# Patient Record
Sex: FEMALE | Race: WHITE | NOT HISPANIC OR LATINO | URBAN - METROPOLITAN AREA
[De-identification: names, ages, dates, MRNs, and addresses within clinical notes are randomized per-mention and may not be internally consistent; named-entity substitution may affect disease eponyms.]

---

## 2017-02-27 ENCOUNTER — EMERGENCY (EMERGENCY)
Facility: HOSPITAL | Age: 82
LOS: 1 days | End: 2017-02-27
Admitting: INTERNAL MEDICINE
Payer: COMMERCIAL

## 2017-02-27 ENCOUNTER — APPOINTMENT (OUTPATIENT)
Dept: CARDIOLOGY | Facility: CLINIC | Age: 82
End: 2017-02-27

## 2017-02-27 DIAGNOSIS — Z95.5 PRESENCE OF CORONARY ANGIOPLASTY IMPLANT AND GRAFT: Chronic | ICD-10-CM

## 2017-02-27 DIAGNOSIS — Z98.89 OTHER SPECIFIED POSTPROCEDURAL STATES: Chronic | ICD-10-CM

## 2017-02-27 PROCEDURE — 94664 DEMO&/EVAL PT USE INHALER: CPT

## 2017-02-27 PROCEDURE — 99283 EMERGENCY DEPT VISIT LOW MDM: CPT | Mod: 25

## 2017-02-27 PROCEDURE — 85610 PROTHROMBIN TIME: CPT

## 2017-02-27 PROCEDURE — 86850 RBC ANTIBODY SCREEN: CPT

## 2017-02-27 PROCEDURE — 86901 BLOOD TYPING SEROLOGIC RH(D): CPT

## 2017-02-27 PROCEDURE — 85027 COMPLETE CBC AUTOMATED: CPT

## 2017-02-27 PROCEDURE — 99283 EMERGENCY DEPT VISIT LOW MDM: CPT

## 2017-02-27 PROCEDURE — 85730 THROMBOPLASTIN TIME PARTIAL: CPT

## 2017-02-27 PROCEDURE — 94640 AIRWAY INHALATION TREATMENT: CPT

## 2017-02-27 PROCEDURE — 80053 COMPREHEN METABOLIC PANEL: CPT

## 2017-02-27 PROCEDURE — 86900 BLOOD TYPING SEROLOGIC ABO: CPT

## 2017-02-27 RX ORDER — OXYMETAZOLINE HYDROCHLORIDE 0.5 MG/ML
2 SPRAY NASAL
Qty: 1 | Refills: 0 | OUTPATIENT
Start: 2017-02-27 | End: 2017-03-04

## 2017-05-03 ENCOUNTER — INPATIENT (INPATIENT)
Facility: HOSPITAL | Age: 82
LOS: 0 days | DRG: 191 | End: 2017-05-04
Attending: FAMILY MEDICINE | Admitting: INTERNAL MEDICINE
Payer: COMMERCIAL

## 2017-05-03 VITALS
TEMPERATURE: 98 F | RESPIRATION RATE: 20 BRPM | SYSTOLIC BLOOD PRESSURE: 70 MMHG | OXYGEN SATURATION: 100 % | WEIGHT: 104.94 LBS | DIASTOLIC BLOOD PRESSURE: 30 MMHG | HEIGHT: 65 IN | HEART RATE: 79 BPM

## 2017-05-03 DIAGNOSIS — Z98.89 OTHER SPECIFIED POSTPROCEDURAL STATES: Chronic | ICD-10-CM

## 2017-05-03 DIAGNOSIS — Z95.5 PRESENCE OF CORONARY ANGIOPLASTY IMPLANT AND GRAFT: Chronic | ICD-10-CM

## 2017-05-03 LAB
ALBUMIN SERPL ELPH-MCNC: 3.1 G/DL — LOW (ref 3.3–5)
ALP SERPL-CCNC: 93 U/L — SIGNIFICANT CHANGE UP (ref 40–120)
ALT FLD-CCNC: 29 U/L — SIGNIFICANT CHANGE UP (ref 12–78)
ANION GAP SERPL CALC-SCNC: 10 MMOL/L — SIGNIFICANT CHANGE UP (ref 5–17)
APPEARANCE UR: CLEAR — SIGNIFICANT CHANGE UP
APTT BLD: 32.2 SEC — SIGNIFICANT CHANGE UP (ref 27.5–37.4)
AST SERPL-CCNC: 25 U/L — SIGNIFICANT CHANGE UP (ref 15–37)
BACTERIA # UR AUTO: ABNORMAL
BASOPHILS # BLD AUTO: 0 K/UL — SIGNIFICANT CHANGE UP (ref 0–0.2)
BASOPHILS NFR BLD AUTO: 0.4 % — SIGNIFICANT CHANGE UP (ref 0–2)
BILIRUB SERPL-MCNC: 0.6 MG/DL — SIGNIFICANT CHANGE UP (ref 0.2–1.2)
BILIRUB UR-MCNC: ABNORMAL
BUN SERPL-MCNC: 46 MG/DL — HIGH (ref 7–23)
CALCIUM SERPL-MCNC: 8.6 MG/DL — SIGNIFICANT CHANGE UP (ref 8.5–10.1)
CHLORIDE SERPL-SCNC: 111 MMOL/L — HIGH (ref 96–108)
CO2 SERPL-SCNC: 23 MMOL/L — SIGNIFICANT CHANGE UP (ref 22–31)
COLOR SPEC: YELLOW — SIGNIFICANT CHANGE UP
CREAT SERPL-MCNC: 2.5 MG/DL — HIGH (ref 0.5–1.3)
DIFF PNL FLD: ABNORMAL
EOSINOPHIL # BLD AUTO: 0 K/UL — SIGNIFICANT CHANGE UP (ref 0–0.5)
EOSINOPHIL NFR BLD AUTO: 0 % — SIGNIFICANT CHANGE UP (ref 0–6)
EPI CELLS # UR: SIGNIFICANT CHANGE UP
GLUCOSE SERPL-MCNC: 100 MG/DL — HIGH (ref 70–99)
GLUCOSE UR QL: NEGATIVE — SIGNIFICANT CHANGE UP
HCT VFR BLD CALC: 48 % — HIGH (ref 34.5–45)
HGB BLD-MCNC: 14.7 G/DL — SIGNIFICANT CHANGE UP (ref 11.5–15.5)
INR BLD: 1.3 RATIO — HIGH (ref 0.88–1.16)
KETONES UR-MCNC: NEGATIVE — SIGNIFICANT CHANGE UP
LACTATE SERPL-SCNC: 2.5 MMOL/L — HIGH (ref 0.7–2)
LEUKOCYTE ESTERASE UR-ACNC: NEGATIVE — SIGNIFICANT CHANGE UP
LYMPHOCYTES # BLD AUTO: 0.9 K/UL — LOW (ref 1–3.3)
LYMPHOCYTES # BLD AUTO: 7.8 % — LOW (ref 13–44)
MCHC RBC-ENTMCNC: 30.7 GM/DL — LOW (ref 32–36)
MCHC RBC-ENTMCNC: 31.4 PG — SIGNIFICANT CHANGE UP (ref 27–34)
MCV RBC AUTO: 102.3 FL — HIGH (ref 80–100)
MONOCYTES # BLD AUTO: 0.7 K/UL — SIGNIFICANT CHANGE UP (ref 0–0.9)
MONOCYTES NFR BLD AUTO: 5.6 % — SIGNIFICANT CHANGE UP (ref 1–9)
NEUTROPHILS # BLD AUTO: 10.2 K/UL — HIGH (ref 1.8–7.4)
NEUTROPHILS NFR BLD AUTO: 86.1 % — HIGH (ref 43–77)
NITRITE UR-MCNC: NEGATIVE — SIGNIFICANT CHANGE UP
PH UR: 5 — SIGNIFICANT CHANGE UP (ref 5–8)
PLATELET # BLD AUTO: 253 K/UL — SIGNIFICANT CHANGE UP (ref 150–400)
POTASSIUM SERPL-MCNC: 3.9 MMOL/L — SIGNIFICANT CHANGE UP (ref 3.5–5.3)
POTASSIUM SERPL-SCNC: 3.9 MMOL/L — SIGNIFICANT CHANGE UP (ref 3.5–5.3)
PROT SERPL-MCNC: 5.4 G/DL — LOW (ref 6–8.3)
PROT UR-MCNC: 150 MG/DL
PROTHROM AB SERPL-ACNC: 14.3 SEC — HIGH (ref 9.8–12.7)
RAPID RVP RESULT: SIGNIFICANT CHANGE UP
RBC # BLD: 4.69 M/UL — SIGNIFICANT CHANGE UP (ref 3.8–5.2)
RBC # FLD: 16.2 % — HIGH (ref 10.3–14.5)
RBC CASTS # UR COMP ASSIST: SIGNIFICANT CHANGE UP /HPF (ref 0–4)
SODIUM SERPL-SCNC: 144 MMOL/L — SIGNIFICANT CHANGE UP (ref 135–145)
SP GR SPEC: 1.02 — SIGNIFICANT CHANGE UP (ref 1.01–1.02)
UROBILINOGEN FLD QL: 1
WBC # BLD: 11.8 K/UL — HIGH (ref 3.8–10.5)
WBC # FLD AUTO: 11.8 K/UL — HIGH (ref 3.8–10.5)
WBC UR QL: SIGNIFICANT CHANGE UP

## 2017-05-03 PROCEDURE — 99285 EMERGENCY DEPT VISIT HI MDM: CPT

## 2017-05-03 PROCEDURE — 71010: CPT | Mod: 26

## 2017-05-03 PROCEDURE — 93010 ELECTROCARDIOGRAM REPORT: CPT

## 2017-05-03 RX ORDER — SODIUM CHLORIDE 9 MG/ML
1000 INJECTION INTRAMUSCULAR; INTRAVENOUS; SUBCUTANEOUS ONCE
Qty: 0 | Refills: 0 | Status: COMPLETED | OUTPATIENT
Start: 2017-05-03 | End: 2017-05-03

## 2017-05-03 RX ORDER — IPRATROPIUM/ALBUTEROL SULFATE 18-103MCG
3 AEROSOL WITH ADAPTER (GRAM) INHALATION ONCE
Qty: 0 | Refills: 0 | Status: COMPLETED | OUTPATIENT
Start: 2017-05-03 | End: 2017-05-03

## 2017-05-03 RX ORDER — SODIUM CHLORIDE 9 MG/ML
1000 INJECTION INTRAMUSCULAR; INTRAVENOUS; SUBCUTANEOUS ONCE
Qty: 0 | Refills: 0 | Status: DISCONTINUED | OUTPATIENT
Start: 2017-05-03 | End: 2017-05-03

## 2017-05-03 RX ORDER — SODIUM CHLORIDE 9 MG/ML
500 INJECTION INTRAMUSCULAR; INTRAVENOUS; SUBCUTANEOUS ONCE
Qty: 0 | Refills: 0 | Status: COMPLETED | OUTPATIENT
Start: 2017-05-03 | End: 2017-05-03

## 2017-05-03 RX ADMIN — Medication 3 MILLILITER(S): at 22:00

## 2017-05-03 RX ADMIN — Medication 3 MILLILITER(S): at 22:25

## 2017-05-03 RX ADMIN — SODIUM CHLORIDE 1000 MILLILITER(S): 9 INJECTION INTRAMUSCULAR; INTRAVENOUS; SUBCUTANEOUS at 22:56

## 2017-05-03 RX ADMIN — Medication 125 MILLIGRAM(S): at 22:00

## 2017-05-03 NOTE — H&P ADULT - NEGATIVE NEUROLOGICAL SYMPTOMS
no loss of sensation/no transient paralysis/no vertigo/no generalized seizures/no tremors/no syncope/no paresthesias/no focal seizures

## 2017-05-03 NOTE — ED ADULT NURSE NOTE - ED STAT RN HANDOFF DETAILS
1 east handoff, accepting RN made aware that 500 Ml of NS IV Bolus still needs to be administered following current running  1 Liter Bolus of NS

## 2017-05-03 NOTE — ED ADULT NURSE NOTE - PMH
Angina pectoris    CAD (coronary artery disease)    Chronic obstructive pulmonary disease    Depression

## 2017-05-03 NOTE — H&P ADULT - HISTORY OF PRESENT ILLNESS
This is a 79 YO Female bib ems for shortness of breath/dyspnea on exertion. No fevers, chills, ha, cough, chest pain, abd pain, d/n/v, edema. This is a 81 YO Female transferred from SNF because of shortness of breath and dyspnea on exertion. No fevers, chills, cough, chest pain, abd pain, d/n/v, edema. Patient mildly cognition deficit.

## 2017-05-03 NOTE — ED PROVIDER NOTE - OBJECTIVE STATEMENT
pt bib ems for shortness of breath/dyspnea on exertion. no fevers, chills, ha, cough, chest pain, abd pain, d/n/v, edema.  pmd/pulm - [facility]

## 2017-05-03 NOTE — ED ADULT NURSE NOTE - OBJECTIVE STATEMENT
pt presenting to ED with shortness of breath, dyspnea with exertion. pt denies pain or any other discomfort at this time.

## 2017-05-04 VITALS
OXYGEN SATURATION: 94 % | SYSTOLIC BLOOD PRESSURE: 134 MMHG | RESPIRATION RATE: 18 BRPM | HEART RATE: 71 BPM | TEMPERATURE: 98 F | DIASTOLIC BLOOD PRESSURE: 87 MMHG

## 2017-05-04 DIAGNOSIS — A41.9 SEPSIS, UNSPECIFIED ORGANISM: ICD-10-CM

## 2017-05-04 DIAGNOSIS — I25.10 ATHEROSCLEROTIC HEART DISEASE OF NATIVE CORONARY ARTERY WITHOUT ANGINA PECTORIS: ICD-10-CM

## 2017-05-04 DIAGNOSIS — N17.9 ACUTE KIDNEY FAILURE, UNSPECIFIED: ICD-10-CM

## 2017-05-04 DIAGNOSIS — J44.1 CHRONIC OBSTRUCTIVE PULMONARY DISEASE WITH (ACUTE) EXACERBATION: ICD-10-CM

## 2017-05-04 DIAGNOSIS — I95.9 HYPOTENSION, UNSPECIFIED: ICD-10-CM

## 2017-05-04 DIAGNOSIS — J18.0 BRONCHOPNEUMONIA, UNSPECIFIED ORGANISM: ICD-10-CM

## 2017-05-04 DIAGNOSIS — I10 ESSENTIAL (PRIMARY) HYPERTENSION: ICD-10-CM

## 2017-05-04 DIAGNOSIS — R06.00 DYSPNEA, UNSPECIFIED: ICD-10-CM

## 2017-05-04 DIAGNOSIS — R06.02 SHORTNESS OF BREATH: ICD-10-CM

## 2017-05-04 LAB
LACTATE SERPL-SCNC: 2.2 MMOL/L — HIGH (ref 0.7–2)
LACTATE SERPL-SCNC: 2.6 MMOL/L — HIGH (ref 0.7–2)
PROCALCITONIN SERPL-MCNC: 1.91 NG/ML — HIGH (ref 0–0.05)
T3 SERPL-MCNC: 45 NG/DL — LOW (ref 80–200)
T4 AB SER-ACNC: 3.5 UG/DL — LOW (ref 4.6–12)
TSH SERPL-MCNC: 8.54 UIU/ML — HIGH (ref 0.36–3.74)

## 2017-05-04 PROCEDURE — 87798 DETECT AGENT NOS DNA AMP: CPT

## 2017-05-04 PROCEDURE — 99285 EMERGENCY DEPT VISIT HI MDM: CPT | Mod: 25

## 2017-05-04 PROCEDURE — 81001 URINALYSIS AUTO W/SCOPE: CPT

## 2017-05-04 PROCEDURE — 85610 PROTHROMBIN TIME: CPT

## 2017-05-04 PROCEDURE — 85027 COMPLETE CBC AUTOMATED: CPT

## 2017-05-04 PROCEDURE — 87086 URINE CULTURE/COLONY COUNT: CPT

## 2017-05-04 PROCEDURE — 94760 N-INVAS EAR/PLS OXIMETRY 1: CPT

## 2017-05-04 PROCEDURE — 84436 ASSAY OF TOTAL THYROXINE: CPT

## 2017-05-04 PROCEDURE — 96365 THER/PROPH/DIAG IV INF INIT: CPT

## 2017-05-04 PROCEDURE — 71045 X-RAY EXAM CHEST 1 VIEW: CPT

## 2017-05-04 PROCEDURE — 87633 RESP VIRUS 12-25 TARGETS: CPT

## 2017-05-04 PROCEDURE — 94640 AIRWAY INHALATION TREATMENT: CPT

## 2017-05-04 PROCEDURE — 84145 PROCALCITONIN (PCT): CPT

## 2017-05-04 PROCEDURE — 84480 ASSAY TRIIODOTHYRONINE (T3): CPT

## 2017-05-04 PROCEDURE — 93005 ELECTROCARDIOGRAM TRACING: CPT

## 2017-05-04 PROCEDURE — 87040 BLOOD CULTURE FOR BACTERIA: CPT

## 2017-05-04 PROCEDURE — 96372 THER/PROPH/DIAG INJ SC/IM: CPT | Mod: 59

## 2017-05-04 PROCEDURE — 85730 THROMBOPLASTIN TIME PARTIAL: CPT

## 2017-05-04 PROCEDURE — 96375 TX/PRO/DX INJ NEW DRUG ADDON: CPT

## 2017-05-04 PROCEDURE — 97162 PT EVAL MOD COMPLEX 30 MIN: CPT

## 2017-05-04 PROCEDURE — 87486 CHLMYD PNEUM DNA AMP PROBE: CPT

## 2017-05-04 PROCEDURE — 87581 M.PNEUMON DNA AMP PROBE: CPT

## 2017-05-04 PROCEDURE — 80053 COMPREHEN METABOLIC PANEL: CPT

## 2017-05-04 PROCEDURE — 96376 TX/PRO/DX INJ SAME DRUG ADON: CPT

## 2017-05-04 PROCEDURE — 84443 ASSAY THYROID STIM HORMONE: CPT

## 2017-05-04 PROCEDURE — 76770 US EXAM ABDO BACK WALL COMP: CPT

## 2017-05-04 PROCEDURE — 76770 US EXAM ABDO BACK WALL COMP: CPT | Mod: 26

## 2017-05-04 PROCEDURE — 83605 ASSAY OF LACTIC ACID: CPT

## 2017-05-04 RX ORDER — FAMOTIDINE 10 MG/ML
20 INJECTION INTRAVENOUS DAILY
Qty: 0 | Refills: 0 | Status: DISCONTINUED | OUTPATIENT
Start: 2017-05-04 | End: 2017-05-04

## 2017-05-04 RX ORDER — ACETAMINOPHEN 500 MG
650 TABLET ORAL EVERY 6 HOURS
Qty: 0 | Refills: 0 | Status: DISCONTINUED | OUTPATIENT
Start: 2017-05-04 | End: 2017-05-04

## 2017-05-04 RX ORDER — ASPIRIN/CALCIUM CARB/MAGNESIUM 324 MG
81 TABLET ORAL DAILY
Qty: 0 | Refills: 0 | Status: DISCONTINUED | OUTPATIENT
Start: 2017-05-04 | End: 2017-05-04

## 2017-05-04 RX ORDER — ALPRAZOLAM 0.25 MG
0.5 TABLET ORAL DAILY
Qty: 0 | Refills: 0 | Status: DISCONTINUED | OUTPATIENT
Start: 2017-05-04 | End: 2017-05-04

## 2017-05-04 RX ORDER — CLOPIDOGREL BISULFATE 75 MG/1
75 TABLET, FILM COATED ORAL DAILY
Qty: 0 | Refills: 0 | Status: DISCONTINUED | OUTPATIENT
Start: 2017-05-04 | End: 2017-05-04

## 2017-05-04 RX ORDER — SENNA PLUS 8.6 MG/1
2 TABLET ORAL AT BEDTIME
Qty: 0 | Refills: 0 | Status: DISCONTINUED | OUTPATIENT
Start: 2017-05-04 | End: 2017-05-04

## 2017-05-04 RX ORDER — SODIUM CHLORIDE 9 MG/ML
1000 INJECTION, SOLUTION INTRAVENOUS
Qty: 0 | Refills: 0 | Status: DISCONTINUED | OUTPATIENT
Start: 2017-05-04 | End: 2017-05-04

## 2017-05-04 RX ORDER — LACTOBACILLUS ACIDOPHILUS 100MM CELL
1 CAPSULE ORAL DAILY
Qty: 0 | Refills: 0 | Status: DISCONTINUED | OUTPATIENT
Start: 2017-05-04 | End: 2017-05-04

## 2017-05-04 RX ORDER — HEPARIN SODIUM 5000 [USP'U]/ML
5000 INJECTION INTRAVENOUS; SUBCUTANEOUS EVERY 12 HOURS
Qty: 0 | Refills: 0 | Status: DISCONTINUED | OUTPATIENT
Start: 2017-05-04 | End: 2017-05-04

## 2017-05-04 RX ORDER — TRAMADOL HYDROCHLORIDE 50 MG/1
25 TABLET ORAL
Qty: 0 | Refills: 0 | Status: DISCONTINUED | OUTPATIENT
Start: 2017-05-04 | End: 2017-05-04

## 2017-05-04 RX ORDER — DOCUSATE SODIUM 100 MG
100 CAPSULE ORAL THREE TIMES A DAY
Qty: 0 | Refills: 0 | Status: DISCONTINUED | OUTPATIENT
Start: 2017-05-04 | End: 2017-05-04

## 2017-05-04 RX ORDER — BUDESONIDE, MICRONIZED 100 %
0.5 POWDER (GRAM) MISCELLANEOUS
Qty: 0 | Refills: 0 | Status: DISCONTINUED | OUTPATIENT
Start: 2017-05-04 | End: 2017-05-04

## 2017-05-04 RX ORDER — ZOLPIDEM TARTRATE 10 MG/1
5 TABLET ORAL AT BEDTIME
Qty: 0 | Refills: 0 | Status: DISCONTINUED | OUTPATIENT
Start: 2017-05-04 | End: 2017-05-04

## 2017-05-04 RX ORDER — METOPROLOL TARTRATE 50 MG
50 TABLET ORAL DAILY
Qty: 0 | Refills: 0 | Status: DISCONTINUED | OUTPATIENT
Start: 2017-05-04 | End: 2017-05-04

## 2017-05-04 RX ORDER — IPRATROPIUM/ALBUTEROL SULFATE 18-103MCG
3 AEROSOL WITH ADAPTER (GRAM) INHALATION EVERY 6 HOURS
Qty: 0 | Refills: 0 | Status: DISCONTINUED | OUTPATIENT
Start: 2017-05-04 | End: 2017-05-04

## 2017-05-04 RX ORDER — TIOTROPIUM BROMIDE 18 UG/1
1 CAPSULE ORAL; RESPIRATORY (INHALATION) AT BEDTIME
Qty: 0 | Refills: 0 | Status: DISCONTINUED | OUTPATIENT
Start: 2017-05-04 | End: 2017-05-04

## 2017-05-04 RX ORDER — LIDOCAINE 4 G/100G
1 CREAM TOPICAL DAILY
Qty: 0 | Refills: 0 | Status: DISCONTINUED | OUTPATIENT
Start: 2017-05-04 | End: 2017-05-04

## 2017-05-04 RX ADMIN — Medication 3 MILLILITER(S): at 18:50

## 2017-05-04 RX ADMIN — FAMOTIDINE 20 MILLIGRAM(S): 10 INJECTION INTRAVENOUS at 11:54

## 2017-05-04 RX ADMIN — HEPARIN SODIUM 5000 UNIT(S): 5000 INJECTION INTRAVENOUS; SUBCUTANEOUS at 05:15

## 2017-05-04 RX ADMIN — Medication 40 MILLIGRAM(S): at 05:15

## 2017-05-04 RX ADMIN — Medication 0.5 MILLIGRAM(S): at 11:54

## 2017-05-04 RX ADMIN — ZOLPIDEM TARTRATE 5 MILLIGRAM(S): 10 TABLET ORAL at 02:05

## 2017-05-04 RX ADMIN — Medication 3 MILLILITER(S): at 13:41

## 2017-05-04 RX ADMIN — Medication 0.5 MILLIGRAM(S): at 07:52

## 2017-05-04 RX ADMIN — Medication 650 MILLIGRAM(S): at 17:58

## 2017-05-04 RX ADMIN — CLOPIDOGREL BISULFATE 75 MILLIGRAM(S): 75 TABLET, FILM COATED ORAL at 11:54

## 2017-05-04 RX ADMIN — Medication 20 MILLIGRAM(S): at 11:54

## 2017-05-04 RX ADMIN — Medication 3 MILLILITER(S): at 07:52

## 2017-05-04 RX ADMIN — SODIUM CHLORIDE 1000 MILLILITER(S): 9 INJECTION INTRAMUSCULAR; INTRAVENOUS; SUBCUTANEOUS at 03:12

## 2017-05-04 RX ADMIN — Medication 81 MILLIGRAM(S): at 11:55

## 2017-05-04 RX ADMIN — Medication 40 MILLIGRAM(S): at 14:23

## 2017-05-04 RX ADMIN — SODIUM CHLORIDE 75 MILLILITER(S): 9 INJECTION, SOLUTION INTRAVENOUS at 11:55

## 2017-05-04 RX ADMIN — HEPARIN SODIUM 5000 UNIT(S): 5000 INJECTION INTRAVENOUS; SUBCUTANEOUS at 17:12

## 2017-05-04 RX ADMIN — Medication 50 MILLIGRAM(S): at 05:15

## 2017-05-04 NOTE — PROGRESS NOTE ADULT - SUBJECTIVE AND OBJECTIVE BOX
Patient is a 81y old  Female who presents with a chief complaint of SOB (03 May 2017 23:54)      INTERVAL HPI/OVERNIGHT EVENTS:    MEDICATIONS  (STANDING):  heparin  Injectable 5000Unit(s) SubCutaneous every 12 hours  aspirin enteric coated 81milliGRAM(s) Oral daily  PARoxetine 20milliGRAM(s) Oral daily  clopidogrel Tablet 75milliGRAM(s) Oral daily  ALPRAZolam 0.5milliGRAM(s) Oral daily  metoprolol succinate ER 50milliGRAM(s) Oral daily  ALBUTerol/ipratropium for Nebulization 3milliLiter(s) Nebulizer every 6 hours  methylPREDNISolone sodium succinate Injectable 40milliGRAM(s) IV Push every 8 hours  famotidine    Tablet 20milliGRAM(s) Oral daily  tiotropium 18 MICROgram(s) Capsule 1Capsule(s) Inhalation at bedtime  buDESOnide   0.5 milliGRAM(s) Respule 0.5milliGRAM(s) Inhalation two times a day  sodium chloride 0.45%. 1000milliLiter(s) IV Continuous <Continuous>    MEDICATIONS  (PRN):  zolpidem 5milliGRAM(s) Oral at bedtime PRN Insomnia  zolpidem 5milliGRAM(s) Oral at bedtime PRN Insomnia  acetaminophen  Suppository 650milliGRAM(s) Rectal every 6 hours PRN For Temp greater than 38 C (100.4 F)  acetaminophen  Suppository. 650milliGRAM(s) Rectal every 6 hours PRN Moderate Pain (4 - 6)      Allergies    loracarbef (Anaphylaxis)  penicillin (Unknown)    Intolerances        REVIEW OF SYSTEMS:  CONSTITUTIONAL: No fever, weight loss, or fatigue  EYES: No eye pain, visual disturbances, or discharge  ENMT:  No difficulty hearing, tinnitus, vertigo; No sinus or throat pain  NECK: No pain or stiffness  BREASTS: No pain, masses, or nipple discharge  RESPIRATORY: No cough, wheezing, chills or hemoptysis; No shortness of breath  CARDIOVASCULAR: No chest pain, palpitations, dizziness, or leg swelling  GASTROINTESTINAL: No abdominal or epigastric pain. No nausea, vomiting, or hematemesis; No diarrhea or constipation. No melena or hematochezia.  GENITOURINARY: No dysuria, frequency, hematuria, or incontinence  NEUROLOGICAL: No headaches, memory loss, loss of strength, numbness, or tremors  SKIN: No itching, burning, rashes, or lesions   LYMPH NODES: No enlarged glands  ENDOCRINE: No heat or cold intolerance; No hair loss  MUSCULOSKELETAL: No joint pain or swelling; No muscle, back, or extremity pain  PSYCHIATRIC: No depression, anxiety, mood swings, or difficulty sleeping  HEME/LYMPH: No easy bruising, or bleeding gums  ALLERY AND IMMUNOLOGIC: No hives or eczema    Vital Signs Last 24 Hrs  T(C): 36.4, Max: 37.8 ( @ 20:41)  T(F): 97.6, Max: 100 ( @ 20:41)  HR: 65 (65 - 79)  BP: 141/96 (70/30 - 141/96)  BP(mean): --  RR: 17 (16 - 20)  SpO2: 94% (94% - 100%)    PHYSICAL EXAM:  GENERAL: NAD, well-groomed, well-developed  HEAD:  Atraumatic, Normocephalic  EYES: EOMI, PERRLA, conjunctiva and sclera clear  ENMT: No tonsillar erythema, exudates, or enlargement; Moist mucous membranes, Good dentition, No lesions  NECK: Supple, No JVD, Normal thyroid  NERVOUS SYSTEM:  Alert & Oriented X3, Good concentration; Motor Strength 5/5 B/L upper and lower extremities; DTRs 2+ intact and symmetric  CHEST/LUNG: Clear to percussion bilaterally; No rales, rhonchi, wheezing, or rubs  HEART: Regular rate and rhythm; No murmurs, rubs, or gallops  ABDOMEN: Soft, Nontender, Nondistended; Bowel sounds present  EXTREMITIES:  2+ Peripheral Pulses, No clubbing, cyanosis, or edema  LYMPH: No lymphadenopathy noted  SKIN: No rashes or lesions    LABS:                        14.7   11.8  )-----------( 253      ( 03 May 2017 21:42 )             48.0     -    144  |  111<H>  |  46<H>  ----------------------------<  100<H>  3.9   |  23  |  2.50<H>    Ca    8.6      03 May 2017 23:23    TPro  5.4<L>  /  Alb  3.1<L>  /  TBili  0.6  /  DBili  x   /  AST  25  /  ALT  29  /  AlkPhos  93  05-03    PT/INR - ( 03 May 2017 21:42 )   PT: 14.3 sec;   INR: 1.30 ratio         PTT - ( 03 May 2017 21:42 )  PTT:32.2 sec  Urinalysis Basic - ( 03 May 2017 23:06 )    Color: Yellow / Appearance: Clear / S.020 / pH: x  Gluc: x / Ketone: Negative  / Bili: Small / Urobili: 1   Blood: x / Protein: 150 mg/dL / Nitrite: Negative   Leuk Esterase: Negative / RBC: 0-2 /HPF / WBC 3-5   Sq Epi: x / Non Sq Epi: Few / Bacteria: Few      CAPILLARY BLOOD GLUCOSE    Lipid panel:           TSH     RADIOLOGY & ADDITIONAL TESTS:    Imaging Personally Reviewed:   [x] YES  [ ] NO    Consultant(s) Notes Reviewed:  [x] YES  [ ] NO    Care Discussed with Consultants/Other Providers [x] YES  [ ] NO Patient is a 81y old  Female who presents with a chief complaint of SOB (03 May 2017 23:54)      INTERVAL HPI/OVERNIGHT EVENTS:  A-O X 3, feeling better then yesterday.    MEDICATIONS  (STANDING):  heparin  Injectable 5000Unit(s) SubCutaneous every 12 hours  aspirin enteric coated 81milliGRAM(s) Oral daily  PARoxetine 20milliGRAM(s) Oral daily  clopidogrel Tablet 75milliGRAM(s) Oral daily  ALPRAZolam 0.5milliGRAM(s) Oral daily  metoprolol succinate ER 50milliGRAM(s) Oral daily  ALBUTerol/ipratropium for Nebulization 3milliLiter(s) Nebulizer every 6 hours  methylPREDNISolone sodium succinate Injectable 40milliGRAM(s) IV Push every 8 hours  famotidine    Tablet 20milliGRAM(s) Oral daily  tiotropium 18 MICROgram(s) Capsule 1Capsule(s) Inhalation at bedtime  buDESOnide   0.5 milliGRAM(s) Respule 0.5milliGRAM(s) Inhalation two times a day  sodium chloride 0.45%. 1000milliLiter(s) IV Continuous <Continuous>    MEDICATIONS  (PRN):  zolpidem 5milliGRAM(s) Oral at bedtime PRN Insomnia  zolpidem 5milliGRAM(s) Oral at bedtime PRN Insomnia  acetaminophen  Suppository 650milliGRAM(s) Rectal every 6 hours PRN For Temp greater than 38 C (100.4 F)  acetaminophen  Suppository. 650milliGRAM(s) Rectal every 6 hours PRN Moderate Pain (4 - 6)      Allergies    loracarbef (Anaphylaxis)  penicillin (Unknown)    Intolerances      Vital Signs Last 24 Hrs  T(C): 36.4, Max: 37.8 (05-03 @ 20:41)  T(F): 97.6, Max: 100 (05-03 @ 20:41)  HR: 65 (65 - 79)  BP: 141/96 (70/30 - 141/96)  BP(mean): --  RR: 17 (16 - 20)  SpO2: 94% (94% - 100%)    PHYSICAL EXAM:  GENERAL: NAD, well-groomed, well-developed  HEAD:  Atraumatic, Normocephalic  EYES: EOMI, PERRLA, conjunctiva and sclera clear  ENMT: No tonsillar erythema, exudates, or enlargement; Moist mucous membranes, Good dentition, No lesions  NECK: Supple, No JVD, Normal thyroid  NERVOUS SYSTEM:  Alert & Oriented X3, Good concentration; Motor Strength 5/5 B/L upper and lower extremities; DTRs 2+ intact and symmetric  CHEST/LUNG: Clear to percussion bilaterally; No rales, rhonchi, wheezing, or rubs  HEART: Regular rate and rhythm; No murmurs, rubs, or gallops  ABDOMEN: Soft, Nontender, Nondistended; Bowel sounds present  EXTREMITIES:  2+ Peripheral Pulses, No clubbing, cyanosis, or edema  LYMPH: No lymphadenopathy noted  SKIN: No rashes or lesions    LABS:                        14.7   11.8  )-----------( 253      ( 03 May 2017 21:42 )             48.0     05-    144  |  111<H>  |  46<H>  ----------------------------<  100<H>  3.9   |  23  |  2.50<H>    Ca    8.6      03 May 2017 23:23    TPro  5.4<L>  /  Alb  3.1<L>  /  TBili  0.6  /  DBili  x   /  AST  25  /  ALT  29  /  AlkPhos  93  -    PT/INR - ( 03 May 2017 21:42 )   PT: 14.3 sec;   INR: 1.30 ratio         PTT - ( 03 May 2017 21:42 )  PTT:32.2 sec  Urinalysis Basic - ( 03 May 2017 23:06 )    Color: Yellow / Appearance: Clear / S.020 / pH: x  Gluc: x / Ketone: Negative  / Bili: Small / Urobili: 1   Blood: x / Protein: 150 mg/dL / Nitrite: Negative   Leuk Esterase: Negative / RBC: 0-2 /HPF / WBC 3-5   Sq Epi: x / Non Sq Epi: Few / Bacteria: Few      CAPILLARY BLOOD GLUCOSE    Lipid panel:           TSH     RADIOLOGY & ADDITIONAL TESTS:    Imaging Personally Reviewed:   [x] YES  [ ] NO    Consultant(s) Notes Reviewed:  [x] YES  [ ] NO    Care Discussed with Consultants/Other Providers [x] YES  [ ] NO

## 2017-05-04 NOTE — CONSULT NOTE ADULT - SUBJECTIVE AND OBJECTIVE BOX
Patient is a 81y old  Female who presents with a chief complaint of SOB. Renal consult called for WOO.     HPI:  This is a 79 YO Female bib ems for shortness of breath/dyspnea on exertion. Renal consult called for WOO. Baseline crea 1.4.   Pt denies any use of NSAIDs. ? Was started on oxybutynin for urinary incontinence.       PAST MEDICAL HISTORY:  Angina pectoris  CAD (coronary artery disease)  Chronic obstructive pulmonary disease  Depression      PAST SURGICAL HISTORY: None      FAMILY HISTORY: None contributory      SOCIAL HISTORY: Former smoker, no alcohol     Allergies    loracarbef (Anaphylaxis)  penicillin (Unknown)    Intolerances      MEDICATIONS  (STANDING):  heparin  Injectable 5000Unit(s) SubCutaneous every 12 hours  aspirin enteric coated 81milliGRAM(s) Oral daily  PARoxetine 20milliGRAM(s) Oral daily  clopidogrel Tablet 75milliGRAM(s) Oral daily  ALPRAZolam 0.5milliGRAM(s) Oral daily  metoprolol succinate ER 50milliGRAM(s) Oral daily  ALBUTerol/ipratropium for Nebulization 3milliLiter(s) Nebulizer every 6 hours  methylPREDNISolone sodium succinate Injectable 40milliGRAM(s) IV Push every 8 hours  famotidine    Tablet 20milliGRAM(s) Oral daily  tiotropium 18 MICROgram(s) Capsule 1Capsule(s) Inhalation at bedtime  buDESOnide   0.5 milliGRAM(s) Respule 0.5milliGRAM(s) Inhalation two times a day    MEDICATIONS  (PRN):  zolpidem 5milliGRAM(s) Oral at bedtime PRN Insomnia  zolpidem 5milliGRAM(s) Oral at bedtime PRN Insomnia  acetaminophen  Suppository 650milliGRAM(s) Rectal every 6 hours PRN For Temp greater than 38 C (100.4 F)  acetaminophen  Suppository. 650milliGRAM(s) Rectal every 6 hours PRN Moderate Pain (4 - 6)      REVIEW OF SYSTEMS:  General: NAD  Respiratory: + SOB  Cardiovascular: No CP or Palpitations	  Gastrointestinal: No nausea, Vomiting. No diarrhea  Genitourinary: ? Urinary incontinence.	  Musculoskeletal: No edema, No new rash or lesions		    T(F): 97.6, Max: 100 (05-03 @ 20:41)  HR: 65 (65 - 79)  BP: 141/96 (70/30 - 141/96)  RR: 17 (16 - 20)  SpO2: 94% (94% - 100%)  Wt(kg): --    PHYSICAL EXAM:  General: NAD  Respiratory: b/l air entry  Cardiovascular: S1 S2  Gastrointestinal: soft  Extremities: no edema            144  |  111<H>  |  46<H>  ----------------------------<  100<H>  3.9   |  23  |  2.50<H>    Ca    8.6      03 May 2017 23:23    TPro  5.4<L>  /  Alb  3.1<L>  /  TBili  0.6  /  DBili  x   /  AST  25  /  ALT  29  /  AlkPhos  93                            14.7   11.8  )-----------( 253      ( 03 May 2017 21:42 )             48.0       Potassium, Serum: 3.9 mmol/L ( @ 23:23)  Blood Urea Nitrogen, Serum: 46 mg/dL ( @ 23:23)  Calcium, Total Serum: 8.6 mg/dL ( @ 23:23)  Hemoglobin: 14.7 g/dL ( @ 21:42)      Creatinine, Serum: 2.50 ( @ 23:23)      Urinalysis Basic - ( 03 May 2017 23:06 )    Color: Yellow / Appearance: Clear / S.020 / pH: x  Gluc: x / Ketone: Negative  / Bili: Small / Urobili: 1   Blood: x / Protein: 150 mg/dL / Nitrite: Negative   Leuk Esterase: Negative / RBC: 0-2 /HPF / WBC 3-5   Sq Epi: x / Non Sq Epi: Few / Bacteria: Few      LIVER FUNCTIONS - ( 03 May 2017 23:23 )  Alb: 3.1 g/dL / Pro: 5.4 g/dL / ALK PHOS: 93 U/L / ALT: 29 U/L / AST: 25 U/L / GGT: x                   I&O's Detail    I & Os for current day (as of 04 May 2017 10:24)  =============================================  IN:    Oral Fluid: 240 ml    Total IN: 240 ml  ---------------------------------------------  OUT:    Total OUT: 0 ml  ---------------------------------------------  Total NET: 240 ml    EXAM:  US KIDNEYS AND BLADDER                            PROCEDURE DATE:  2017        INTERPRETATION:  EXAM: Renal and urinary bladder sonogram.    CLINICAL INFORMATION: Acute kidney injury.    FINDINGS: Right kidney measures 8.3 cm in length. Left kidney measures   7.8 cm in length. Bilateral renal cysts are noted. No hydronephrosis is   seen. No solid malignant appearing mass lesions are seen related to   either kidney. No sonographically detectable renal calculi are noted.   Incidentally noted is a 7 mm calculus in the gallbladder lumen. Common   bile duct is incidentally noted to be dilated to 9 mm diameter. Clinical   correlation is advised. Ascites is present. Urinary bladder volume is 136   mL. Patient was not able to void. Ureteral jets were not visualized.    IMPRESSION: No hydronephrosis. Urinary bladder volume is 136 mL as   discussed above. Patient is a 81y old  Female who presents with a chief complaint of SOB. Renal consult called for WOO.     HPI:  This is a 81 YO Female bib ems for shortness of breath/dyspnea on exertion. Renal consult called for WOO. Baseline crea 1.4.   Pt denies any use of NSAIDs. ? Was started on oxybutynin for urinary incontinence.     b/l small kidneys on sonogram. ? atrophic left kidney.       PAST MEDICAL HISTORY:  Angina pectoris  CAD (coronary artery disease)  Chronic obstructive pulmonary disease  Depression      PAST SURGICAL HISTORY: None      FAMILY HISTORY: None contributory      SOCIAL HISTORY: Former smoker, no alcohol     Allergies    loracarbef (Anaphylaxis)  penicillin (Unknown)    Intolerances      MEDICATIONS  (STANDING):  heparin  Injectable 5000Unit(s) SubCutaneous every 12 hours  aspirin enteric coated 81milliGRAM(s) Oral daily  PARoxetine 20milliGRAM(s) Oral daily  clopidogrel Tablet 75milliGRAM(s) Oral daily  ALPRAZolam 0.5milliGRAM(s) Oral daily  metoprolol succinate ER 50milliGRAM(s) Oral daily  ALBUTerol/ipratropium for Nebulization 3milliLiter(s) Nebulizer every 6 hours  methylPREDNISolone sodium succinate Injectable 40milliGRAM(s) IV Push every 8 hours  famotidine    Tablet 20milliGRAM(s) Oral daily  tiotropium 18 MICROgram(s) Capsule 1Capsule(s) Inhalation at bedtime  buDESOnide   0.5 milliGRAM(s) Respule 0.5milliGRAM(s) Inhalation two times a day    MEDICATIONS  (PRN):  zolpidem 5milliGRAM(s) Oral at bedtime PRN Insomnia  zolpidem 5milliGRAM(s) Oral at bedtime PRN Insomnia  acetaminophen  Suppository 650milliGRAM(s) Rectal every 6 hours PRN For Temp greater than 38 C (100.4 F)  acetaminophen  Suppository. 650milliGRAM(s) Rectal every 6 hours PRN Moderate Pain (4 - 6)      REVIEW OF SYSTEMS:  General: NAD  Respiratory: + SOB  Cardiovascular: No CP or Palpitations	  Gastrointestinal: No nausea, Vomiting. No diarrhea  Genitourinary: ? Urinary incontinence.	  Musculoskeletal: No edema, No new rash or lesions		    T(F): 97.6, Max: 100 (05-03 @ 20:41)  HR: 65 (65 - 79)  BP: 141/96 (70/30 - 141/96)  RR: 17 (16 - 20)  SpO2: 94% (94% - 100%)  Wt(kg): --    PHYSICAL EXAM:  General: NAD  Respiratory: b/l air entry  Cardiovascular: S1 S2  Gastrointestinal: soft  Extremities: no edema            144  |  111<H>  |  46<H>  ----------------------------<  100<H>  3.9   |  23  |  2.50<H>    Ca    8.6      03 May 2017 23:23    TPro  5.4<L>  /  Alb  3.1<L>  /  TBili  0.6  /  DBili  x   /  AST  25  /  ALT  29  /  AlkPhos  93                            14.7   11.8  )-----------( 253      ( 03 May 2017 21:42 )             48.0       Potassium, Serum: 3.9 mmol/L ( @ 23:23)  Blood Urea Nitrogen, Serum: 46 mg/dL ( @ 23:23)  Calcium, Total Serum: 8.6 mg/dL ( @ 23:23)  Hemoglobin: 14.7 g/dL ( @ 21:42)      Creatinine, Serum: 2.50 ( @ 23:23)      Urinalysis Basic - ( 03 May 2017 23:06 )    Color: Yellow / Appearance: Clear / S.020 / pH: x  Gluc: x / Ketone: Negative  / Bili: Small / Urobili: 1   Blood: x / Protein: 150 mg/dL / Nitrite: Negative   Leuk Esterase: Negative / RBC: 0-2 /HPF / WBC 3-5   Sq Epi: x / Non Sq Epi: Few / Bacteria: Few      LIVER FUNCTIONS - ( 03 May 2017 23:23 )  Alb: 3.1 g/dL / Pro: 5.4 g/dL / ALK PHOS: 93 U/L / ALT: 29 U/L / AST: 25 U/L / GGT: x                   I&O's Detail    I & Os for current day (as of 04 May 2017 10:24)  =============================================  IN:    Oral Fluid: 240 ml    Total IN: 240 ml  ---------------------------------------------  OUT:    Total OUT: 0 ml  ---------------------------------------------  Total NET: 240 ml    EXAM:  US KIDNEYS AND BLADDER                            PROCEDURE DATE:  2017        INTERPRETATION:  EXAM: Renal and urinary bladder sonogram.    CLINICAL INFORMATION: Acute kidney injury.    FINDINGS: Right kidney measures 8.3 cm in length. Left kidney measures   7.8 cm in length. Bilateral renal cysts are noted. No hydronephrosis is   seen. No solid malignant appearing mass lesions are seen related to   either kidney. No sonographically detectable renal calculi are noted.   Incidentally noted is a 7 mm calculus in the gallbladder lumen. Common   bile duct is incidentally noted to be dilated to 9 mm diameter. Clinical   correlation is advised. Ascites is present. Urinary bladder volume is 136   mL. Patient was not able to void. Ureteral jets were not visualized.    IMPRESSION: No hydronephrosis. Urinary bladder volume is 136 mL as   discussed above.

## 2017-05-04 NOTE — CHART NOTE - NSCHARTNOTEFT_GEN_A_CORE
Expiration Note- PGY2 On Call    Assessed patient at bedside as patient appears to not be breathing/ is pulseless / has asystole on monitor. Pt unresponsive to verbal, physical and painful stimuli.    Pupils were not reactive.  There was no corneal reflex.  There was no carotid or femoral pulse. There were no heart or lung sounds. sounds.     Time of death: 18:58 May 5th, 2017   Attending: Dr Hooks notified  Family member Gabriella (HCP) notified.  They will not be coming to the hospital.

## 2017-05-04 NOTE — DISCHARGE NOTE FOR THE EXPIRED PATIENT - SECONDARY DIAGNOSIS.
Chronic obstructive pulmonary disease with acute exacerbation Dyspnea on exertion Coronary artery disease involving native coronary artery of native heart without angina pectoris Depression, unspecified depression type Angina pectoris WOO (acute kidney injury) Bronchopneumonia

## 2017-05-04 NOTE — GOALS OF CARE CONVERSATION - PERSONAL ADVANCE DIRECTIVE - CONVERSATION DETAILS
spoke to pt, [employer], pt knew her daughter oswald is away and daughter nikki phone number. spoke to Nikki on phone, there is a hcp, pt daughter Oswald is hcp, but out of the country till 5/9/17. Nikki is surrogate for medical decisions at present, knew pt directives regarding cpr, dnr dni. daughter agreed to dnr dni, hospital consent obtained. will email the molst form to discuss further. contact # given

## 2017-05-04 NOTE — PROVIDER CONTACT NOTE (CRITICAL VALUE NOTIFICATION) - ACTION/TREATMENT ORDERED:
Continue with IV fluid as ordered
MD said this should not have been redrawn before the bolus was hung. She said to finish this bolus, hang the next bolus, and she'll put in orders to redraw bolus in four hours.

## 2017-05-04 NOTE — CONSULT NOTE ADULT - PROBLEM SELECTOR PROBLEM 3
Coronary artery disease involving native coronary artery of native heart without angina pectoris
Hypertension

## 2017-05-04 NOTE — PROGRESS NOTE ADULT - SUBJECTIVE AND OBJECTIVE BOX
Patient is a 81y old  Female who presents with a chief complaint of SOB (03 May 2017 23:54)      INTERVAL /OVERNIGHT EVENTS: + constipation, feels dry, still SOB / FOWLER    MEDICATIONS  (STANDING):  heparin  Injectable 5000Unit(s) SubCutaneous every 12 hours  aspirin enteric coated 81milliGRAM(s) Oral daily  PARoxetine 20milliGRAM(s) Oral daily  clopidogrel Tablet 75milliGRAM(s) Oral daily  ALPRAZolam 0.5milliGRAM(s) Oral daily  metoprolol succinate ER 50milliGRAM(s) Oral daily  ALBUTerol/ipratropium for Nebulization 3milliLiter(s) Nebulizer every 6 hours  methylPREDNISolone sodium succinate Injectable 40milliGRAM(s) IV Push every 8 hours  famotidine    Tablet 20milliGRAM(s) Oral daily  tiotropium 18 MICROgram(s) Capsule 1Capsule(s) Inhalation at bedtime  buDESOnide   0.5 milliGRAM(s) Respule 0.5milliGRAM(s) Inhalation two times a day  sodium chloride 0.45%. 1000milliLiter(s) IV Continuous <Continuous>  docusate sodium 100milliGRAM(s) Oral three times a day  senna 2Tablet(s) Oral at bedtime  lactobacillus acidophilus 1Tablet(s) Oral daily  multivitamin 1Tablet(s) Oral daily    MEDICATIONS  (PRN):  zolpidem 5milliGRAM(s) Oral at bedtime PRN Insomnia  zolpidem 5milliGRAM(s) Oral at bedtime PRN Insomnia  bisacodyl 5milliGRAM(s) Oral every 12 hours PRN Constipation  acetaminophen   Tablet. 650milliGRAM(s) Oral every 6 hours PRN Moderate Pain (4 - 6)      Allergies    loracarbef (Anaphylaxis)  penicillin (Unknown)    Intolerances        REVIEW OF SYSTEMS:  CONSTITUTIONAL: No fever, weight loss, or fatigue  EYES: No eye pain, visual disturbances, or discharge  ENMT:  No difficulty hearing, tinnitus, vertigo; No sinus or throat pain  NECK: No pain or stiffness  RESPIRATORY: No cough, wheezing, chills or hemoptysis; No shortness of breath  CARDIOVASCULAR: No chest pain, palpitations, dizziness, or leg swelling  GASTROINTESTINAL: No abdominal or epigastric pain. No nausea, vomiting, or hematemesis; No diarrhea or constipation. No melena or hematochezia.  GENITOURINARY: No dysuria, frequency, hematuria, or incontinence  NEUROLOGICAL: No headaches, memory loss, loss of strength, numbness, or tremors  SKIN: No itching, burning, rashes, or lesions   LYMPH NODES: No enlarged glands  ENDOCRINE: No heat or cold intolerance; No hair loss; No polydipsia or polyuria  MUSCULOSKELETAL: No joint pain or swelling; No muscle, back, or extremity pain  PSYCHIATRIC: No depression, anxiety, mood swings, or difficulty sleeping  HEME/LYMPH: No easy bruising, or bleeding gums  ALLERGY AND IMMUNOLOGIC: No hives or eczema    Vital Signs Last 24 Hrs  T(C): 36.4, Max: 37.8 ( @ 20:41)  T(F): 97.6, Max: 100 ( @ 20:41)  HR: 71 (65 - 79)  BP: 134/87 (70/30 - 141/96)  BP(mean): --  RR: 18 (16 - 20)  SpO2: 94% (94% - 100%)    PHYSICAL EXAM:  GENERAL: NAD, well-groomed, well-developed  HEAD:  Atraumatic, Normocephalic  EYES: EOMI, PERRLA, conjunctiva and sclera clear  ENMT: No tonsillar erythema, exudates, or enlargement; Moist mucous membranes, Good dentition, No lesions  NECK: Supple, No JVD, Normal thyroid  NERVOUS SYSTEM:  Alert & Oriented X3, Good concentration; Motor Strength 5/5 B/L upper and lower extremities; DTRs 2+ intact and symmetric  CHEST/LUNG: Clear to auscultation bilaterally; No rales, rhonchi, wheezing, or rubs  HEART: Regular rate and rhythm; No murmurs, rubs, or gallops  ABDOMEN: Soft, Nontender, Nondistended; Bowel sounds present  EXTREMITIES:  2+ Peripheral Pulses, No clubbing, cyanosis, or edema  LYMPH: No lymphadenopathy noted  SKIN: No rashes or lesions    LABS:                        14.7   11.8  )-----------( 253      ( 03 May 2017 21:42 )             48.0     03 May 2017 23:23    144    |  111    |  46     ----------------------------<  100    3.9     |  23     |  2.50     Ca    8.6        03 May 2017 23:23    TPro  5.4    /  Alb  3.1    /  TBili  0.6    /  DBili  x      /  AST  25     /  ALT  29     /  AlkPhos  93     03 May 2017 23:23    PT/INR - ( 03 May 2017 21:42 )   PT: 14.3 sec;   INR: 1.30 ratio         PTT - ( 03 May 2017 21:42 )  PTT:32.2 sec  Urinalysis Basic - ( 03 May 2017 23:06 )    Color: Yellow / Appearance: Clear / S.020 / pH: x  Gluc: x / Ketone: Negative  / Bili: Small / Urobili: 1   Blood: x / Protein: 150 mg/dL / Nitrite: Negative   Leuk Esterase: Negative / RBC: 0-2 /HPF / WBC 3-5   Sq Epi: x / Non Sq Epi: Few / Bacteria: Few      CAPILLARY BLOOD GLUCOSE      RADIOLOGY & ADDITIONAL TESTS: EXAM:  CHEST 1 VIEW                            PROCEDURE DATE:  2017        INTERPRETATION:  Short of breath.    AP chest.  Heart magnified by AP film, not grossly enlarged. Atherosclerotic aorta.   Symmetrically hyperinflated lungs. No consolidation or effusion. Right   costophrenic angle excluded. Degenerative change right acromioclavicular   joint.    Impression: Hyperinflated lungs. No active infiltrates.        EXAM:  US KIDNEYS AND BLADDER                            PROCEDURE DATE:  2017        INTERPRETATION:  EXAM: Renal and urinary bladder sonogram.    CLINICAL INFORMATION: Acute kidney injury.    FINDINGS: Right kidney measures 8.3 cm in length. Left kidney measures   7.8 cm in length. Bilateral renal cysts are noted. No hydronephrosis is   seen. No solid malignant appearing mass lesions are seen related to   either kidney. No sonographically detectable renal calculi are noted.   Incidentally noted is a 7 mm calculus in the gallbladder lumen. Common   bile duct is incidentally noted to be dilated to 9 mm diameter. Clinical   correlation is advised. Ascites is present. Urinary bladder volume is 136   mL. Patient was not able to void. Ureteral jets were not visualized.    IMPRESSION: No hydronephrosis. Urinary bladder volume is 136 mL as   discussed above.Notes Reviewed:  [x ] YES  [ ] NO    Care Discussed with Consultants/Other Providers [x ] YES  [ ] NO

## 2017-05-04 NOTE — PROGRESS NOTE ADULT - PROBLEM SELECTOR PROBLEM 3
Hypertension
Coronary artery disease involving native coronary artery of native heart without angina pectoris

## 2017-05-04 NOTE — PHYSICAL THERAPY INITIAL EVALUATION ADULT - ADDITIONAL COMMENTS
Pt stated she ambulates only with assist from aide with rolling walker.  Also has W/C at home for long distances and home O2.

## 2017-05-04 NOTE — DISCHARGE NOTE FOR THE EXPIRED PATIENT - HOSPITAL COURSE
Patient was transferred from SNF because of sudden onset of dyspnea and on ER arrival patient was hypoxic.  Patient has history of COPD. Patient was feeling better in am after bronchodilators. Patient was ambulatory. Patient went to bathroom and came back normal to bed. RN noticed that she was quit and she went check her  and she found her unresponsive and there was no pulse or respiration. DNR/DNI protocol followed patient's DTR informed. ME called and case discussed and ME released the case.

## 2017-05-04 NOTE — CONSULT NOTE ADULT - SUBJECTIVE AND OBJECTIVE BOX
PULMONARY/CRITICAL CARE        Patient is a 81y old  Female who presents with a chief complaint of SOB   Pt well known to me. Hx severe O2 dependent COPD. C/o severe weakness. No fever,chills sweats.    BRIEF HOSPITAL COURSE: ***    Events last 24 hours: ***    PAST MEDICAL & SURGICAL HISTORY:  Angina pectoris  CAD (coronary artery disease)  Chronic obstructive pulmonary disease  Depression    Allergies    loracarbef (Anaphylaxis)  penicillin (Unknown)    Intolerances      FAMILY HISTORY: N/C  Soc Hx--lives at home. Former heavy smoker. No etoh      Review of Systems:  CONSTITUTIONAL: No fever, chills, or fatigue  EYES: No eye pain, visual disturbances, or discharge  ENMT:  No difficulty hearing, tinnitus, vertigo; No sinus or throat pain  NECK: No pain or stiffness  RESPIRATORY: No cough, wheezing, chills or hemoptysis; Mod. shortness of breath  CARDIOVASCULAR: No chest pain, palpitations, dizziness, or leg swelling  GASTROINTESTINAL: No abdominal or epigastric pain. No nausea, vomiting, or hematemesis; No diarrhea or constipation. No melena or hematochezia.  GENITOURINARY: No dysuria, frequency, hematuria, or incontinence  NEUROLOGICAL: No headaches, memory loss, loss of strength, numbness, or tremors  SKIN: No itching, burning, rashes, or lesions   MUSCULOSKELETAL: No joint pain or swelling; No muscle, back, or extremity pain Some swelling right lower leg.  PSYCHIATRIC: No depression, anxiety, mood swings, or difficulty sleeping      Medications:    metoprolol succinate ER 50milliGRAM(s) Oral daily    ALBUTerol/ipratropium for Nebulization 3milliLiter(s) Nebulizer every 6 hours  tiotropium 18 MICROgram(s) Capsule 1Capsule(s) Inhalation at bedtime  buDESOnide   0.5 milliGRAM(s) Respule 0.5milliGRAM(s) Inhalation two times a day    PARoxetine 20milliGRAM(s) Oral daily  ALPRAZolam 0.5milliGRAM(s) Oral daily  zolpidem 5milliGRAM(s) Oral at bedtime PRN  zolpidem 5milliGRAM(s) Oral at bedtime PRN  acetaminophen  Suppository 650milliGRAM(s) Rectal every 6 hours PRN  acetaminophen  Suppository. 650milliGRAM(s) Rectal every 6 hours PRN      heparin  Injectable 5000Unit(s) SubCutaneous every 12 hours  aspirin enteric coated 81milliGRAM(s) Oral daily  clopidogrel Tablet 75milliGRAM(s) Oral daily    famotidine    Tablet 20milliGRAM(s) Oral daily      methylPREDNISolone sodium succinate Injectable 40milliGRAM(s) IV Push every 8 hours                  ICU Vital Signs Last 24 Hrs  T(C): 36.4, Max: 37.8 ( @ 20:41)  T(F): 97.6, Max: 100 ( @ 20:41)  HR: 65 (65 - 79)  BP: 141/96 (70/30 - 141/96)  BP(mean): --  ABP: --  ABP(mean): --  RR: 17 (16 - 20)  SpO2: 94% (94% - 100%)    Vital Signs Last 24 Hrs  T(C): 36.4, Max: 37.8 ( @ 20:41)  T(F): 97.6, Max: 100 ( @ 20:41)  HR: 65 (65 - 79)  BP: 141/96 (70/30 - 141/96)  BP(mean): --  RR: 17 (16 - 20)  SpO2: 94% (94% - 100%)        I&O's Detail        LABS:                        14.7   11.8  )-----------( 253      ( 03 May 2017 21:42 )             48.0         144  |  111<H>  |  46<H>  ----------------------------<  100<H>  3.9   |  23  |  2.50<H>    Ca    8.6      03 May 2017 23:23    TPro  5.4<L>  /  Alb  3.1<L>  /  TBili  0.6  /  DBili  x   /  AST  25  /  ALT  29  /  AlkPhos  93            CAPILLARY BLOOD GLUCOSE    PT/INR - ( 03 May 2017 21:42 )   PT: 14.3 sec;   INR: 1.30 ratio         PTT - ( 03 May 2017 21:42 )  PTT:32.2 sec  Urinalysis Basic - ( 03 May 2017 23:06 )    Color: Yellow / Appearance: Clear / S.020 / pH: x  Gluc: x / Ketone: Negative  / Bili: Small / Urobili: 1   Blood: x / Protein: 150 mg/dL / Nitrite: Negative   Leuk Esterase: Negative / RBC: 0-2 /HPF / WBC 3-5   Sq Epi: x / Non Sq Epi: Few / Bacteria: Few      CULTURES:      Physical Examination:  Alert and conversant.  General: No acute distress.      HEENT: Pupils equal, reactive to light.  Symmetric.    PULM: Few bas crackles, decreased BS. no wheeze, rhonchi. No change percussion or fremitus    CVS: Regular rate and rhythm, no murmurs, rubs, or gallops    ABD: Soft, nondistended, nontender, normoactive bowel sounds, no masses    EXT: Trace leg edema, nontender    SKIN: Warm and well perfused, no rashes noted.    NEURO: Alert, oriented, interactive, nonfocal    RADIOLOGY: ***    CRITICAL CARE TIME SPENT: ***

## 2017-05-05 LAB
CULTURE RESULTS: NO GROWTH — SIGNIFICANT CHANGE UP
SPECIMEN SOURCE: SIGNIFICANT CHANGE UP

## 2017-05-09 LAB
CULTURE RESULTS: SIGNIFICANT CHANGE UP
SPECIMEN SOURCE: SIGNIFICANT CHANGE UP

## 2017-05-09 RX ORDER — ZOLPIDEM TARTRATE 10 MG/1
1 TABLET ORAL
Qty: 0 | Refills: 0 | COMMUNITY

## 2017-05-09 RX ORDER — CLOPIDOGREL BISULFATE 75 MG/1
0 TABLET, FILM COATED ORAL
Qty: 0 | Refills: 0 | COMMUNITY

## 2017-05-09 RX ORDER — METOPROLOL TARTRATE 50 MG
1 TABLET ORAL
Qty: 0 | Refills: 0 | COMMUNITY

## 2017-05-09 RX ORDER — ALPRAZOLAM 0.25 MG
1 TABLET ORAL
Qty: 0 | Refills: 0 | COMMUNITY

## 2017-05-09 RX ORDER — FLUTICASONE PROPIONATE AND SALMETEROL 50; 250 UG/1; UG/1
1 POWDER ORAL; RESPIRATORY (INHALATION)
Qty: 0 | Refills: 0 | COMMUNITY

## 2017-05-09 RX ORDER — ASPIRIN/CALCIUM CARB/MAGNESIUM 324 MG
1 TABLET ORAL
Qty: 0 | Refills: 0 | COMMUNITY

## 2017-05-10 DIAGNOSIS — J18.0 BRONCHOPNEUMONIA, UNSPECIFIED ORGANISM: ICD-10-CM

## 2017-05-10 DIAGNOSIS — N17.9 ACUTE KIDNEY FAILURE, UNSPECIFIED: ICD-10-CM

## 2017-05-10 DIAGNOSIS — Z88.8 ALLERGY STATUS TO OTHER DRUGS, MEDICAMENTS AND BIOLOGICAL SUBSTANCES STATUS: ICD-10-CM

## 2017-05-10 DIAGNOSIS — I25.10 ATHEROSCLEROTIC HEART DISEASE OF NATIVE CORONARY ARTERY WITHOUT ANGINA PECTORIS: ICD-10-CM

## 2017-05-10 DIAGNOSIS — Z87.891 PERSONAL HISTORY OF NICOTINE DEPENDENCE: ICD-10-CM

## 2017-05-10 DIAGNOSIS — E86.0 DEHYDRATION: ICD-10-CM

## 2017-05-10 DIAGNOSIS — Z99.81 DEPENDENCE ON SUPPLEMENTAL OXYGEN: ICD-10-CM

## 2017-05-10 DIAGNOSIS — J44.1 CHRONIC OBSTRUCTIVE PULMONARY DISEASE WITH (ACUTE) EXACERBATION: ICD-10-CM

## 2017-05-10 DIAGNOSIS — Z88.0 ALLERGY STATUS TO PENICILLIN: ICD-10-CM

## 2017-05-10 DIAGNOSIS — Z79.82 LONG TERM (CURRENT) USE OF ASPIRIN: ICD-10-CM

## 2017-05-10 DIAGNOSIS — F32.9 MAJOR DEPRESSIVE DISORDER, SINGLE EPISODE, UNSPECIFIED: ICD-10-CM

## 2017-05-10 DIAGNOSIS — Z66 DO NOT RESUSCITATE: ICD-10-CM

## 2017-05-10 DIAGNOSIS — Z79.02 LONG TERM (CURRENT) USE OF ANTITHROMBOTICS/ANTIPLATELETS: ICD-10-CM

## 2017-05-10 DIAGNOSIS — Z79.51 LONG TERM (CURRENT) USE OF INHALED STEROIDS: ICD-10-CM

## 2017-05-10 DIAGNOSIS — J18.9 PNEUMONIA, UNSPECIFIED ORGANISM: ICD-10-CM

## 2017-05-11 ENCOUNTER — APPOINTMENT (OUTPATIENT)
Dept: CARDIOLOGY | Facility: CLINIC | Age: 82
End: 2017-05-11

## 2019-07-16 NOTE — ED ADULT NURSE NOTE - ATTEMPT TO OOB
Patient comes to clinic for follow up anticoagulation visit.   Last INR 7/8/19 was 1.4.  Dose increased per protocol.   Today's INR is 1.8 and is on low end of thereapeutic goal range.    Current warfarin dosing verified with patient. Patient was informed that their INR result is on low end of therapeutic range and instructed to take boost dose today, then maintain current dose per protocol. Discussed dose and return date for next INR.    CHADS4. Re-admit to [facility] from PCP. Was at SNF, then d/c to home care with [facility]. INR today is 1.8, below range and up from previous 1.4, in one week on increased dose of 30mg/wk (from 28mg/wk) with 8mg boost dose on 7/8. Denies missing any doses. Pt ambulating well. Reports INR was quite low while being followed by [facility]/PCP dosing. Denies changes in diet. Instructed pt to take 8mg tonight, then continue dose of 30mg/wk, and recheck INR in 1 week to assure INR in range.    Dr. Saira August is in the office today supervising the treatment. Today's Onsite Provider is Dr August for billing purposes. Dosing sent to On Site provider for review.    Patient was instructed to contact the clinic with any unusual bleeding or bruising, any changes in medications, diet, health status, lifestyle, or any other changes, questions or concerns. Patient verbalized understanding of all discussed.     Instructed pt to take 8mg tonight, then continue dose of 30mg/wk, and recheck INR in 1 week to assure INR in range.    
no

## 2020-12-29 NOTE — CONSULT NOTE ADULT - PROBLEM SELECTOR RECOMMENDATION 9
Steroids  [employer] [employer] Pregnancy And Lactation Text: This medication is Pregnancy Category B and is considered safe. There is some data to suggest avoiding during the third trimester. It is unknown if this medication is excreted in breast milk.

## 2022-06-15 NOTE — CONSULT NOTE ADULT - PROBLEM SELECTOR RECOMMENDATION 9
? Prerenal azotemia, +/- ATN. No evidence of Urinary retention.   Check repeat labs and monitor renal function trend. Encourage PO intake as tolerated. Labs as ordered. Renal sonogram results noted. Avoid nephrotoxic meds as possible. Avoid ACEI, ARB and NSAIDS. Monitor input and output. Gentle IV hydration. 16-Jun-2022 16-Jun-2022 16-Jun-2022 16-Jun-2022 14-Jun-2022 16-Jun-2022 ? Prerenal azotemia, +/- ATN. No evidence of Urinary retention.   Check repeat labs and monitor renal function trend. Encourage PO intake as tolerated. Labs as ordered. Renal sonogram results noted. Avoid nephrotoxic meds as possible. Avoid ACEI, ARB and NSAIDS. Monitor input and output. Gentle IV hydration. Small b/l kidneys / possible atrophic left kidney. 16-Jun-2022

## 2023-06-27 NOTE — PATIENT PROFILE ADULT. - VISION (WITH CORRECTIVE LENSES IF THE PATIENT USUALLY WEARS THEM):
Partially impaired: cannot see medication labels or newsprint, but can see obstacles in path, and the surrounding layout; can count fingers at arm's length/glasses
Denies

## 2023-09-06 NOTE — PATIENT PROFILE ADULT. - NS PRO MODE OF ARRIVAL
stretcher Secondary Intention Text (Leave Blank If You Do Not Want): The defect will heal with secondary intention.

## 2024-02-08 NOTE — PHYSICAL THERAPY INITIAL EVALUATION ADULT - TRANSFER TRAINING, PT EVAL
Uncontrolled today, with review of historic BP trends also not ideal.  On ACE-I above typical max dose. Recommend trial of lisinopril 40mg daily in combination with HCTZ 12.5mg daily.   Anticipate desired weight loss may also help with BP goals.   3-5 Sessions:  Assess Transfers